# Patient Record
Sex: FEMALE | Race: WHITE | Employment: FULL TIME | URBAN - METROPOLITAN AREA
[De-identification: names, ages, dates, MRNs, and addresses within clinical notes are randomized per-mention and may not be internally consistent; named-entity substitution may affect disease eponyms.]

---

## 2018-08-10 ENCOUNTER — OFFICE VISIT (OUTPATIENT)
Dept: FAMILY MEDICINE CLINIC | Facility: CLINIC | Age: 28
End: 2018-08-10

## 2018-08-10 VITALS
SYSTOLIC BLOOD PRESSURE: 112 MMHG | HEART RATE: 96 BPM | DIASTOLIC BLOOD PRESSURE: 60 MMHG | BODY MASS INDEX: 28.63 KG/M2 | RESPIRATION RATE: 16 BRPM | HEIGHT: 63.5 IN | WEIGHT: 163.63 LBS | TEMPERATURE: 98 F | OXYGEN SATURATION: 98 %

## 2018-08-10 DIAGNOSIS — M47.9 DEGENERATIVE JOINT DISEASE OF LOW BACK: ICD-10-CM

## 2018-08-10 DIAGNOSIS — M54.2 CERVICAL PAIN (NECK): ICD-10-CM

## 2018-08-10 DIAGNOSIS — E06.3 HASHIMOTO'S THYROIDITIS: ICD-10-CM

## 2018-08-10 DIAGNOSIS — K58.2 IRRITABLE BOWEL SYNDROME WITH BOTH CONSTIPATION AND DIARRHEA: ICD-10-CM

## 2018-08-10 DIAGNOSIS — F41.9 ANXIETY: ICD-10-CM

## 2018-08-10 DIAGNOSIS — N97.9 FEMALE FERTILITY PROBLEM: ICD-10-CM

## 2018-08-10 DIAGNOSIS — Z00.00 ANNUAL PHYSICAL EXAM: Primary | ICD-10-CM

## 2018-08-10 PROBLEM — K58.9 IRRITABLE BOWEL SYNDROME (IBS): Status: ACTIVE | Noted: 2018-08-10

## 2018-08-10 PROCEDURE — 99385 PREV VISIT NEW AGE 18-39: CPT | Performed by: FAMILY MEDICINE

## 2018-08-10 RX ORDER — LEVOTHYROXINE SODIUM 0.15 MG/1
1 TABLET ORAL DAILY
COMMUNITY
Start: 2018-07-11 | End: 2019-08-21 | Stop reason: DRUGHIGH

## 2018-08-10 RX ORDER — BUPROPION HYDROCHLORIDE 150 MG/1
150 TABLET, EXTENDED RELEASE ORAL 2 TIMES DAILY
COMMUNITY
End: 2019-08-21 | Stop reason: ALTCHOICE

## 2018-08-10 RX ORDER — DEXAMETHASONE 2 MG/1
2 TABLET ORAL DAILY
COMMUNITY
End: 2019-08-21 | Stop reason: ALTCHOICE

## 2018-08-10 NOTE — PATIENT INSTRUCTIONS
rec stool softner-- colace 50 mg a day and monitor bowels    Monitor skin for now pending pregnancy    chiropractic referral- in network    Continue gyne and reproductive endo    Continue endocrine

## 2018-08-10 NOTE — PROGRESS NOTES
CC: Annual Physical Exam    HPI:   Albaro Alexis is a 33year old female who presents for a complete physical exam.   Hashimotos thyroiditis  Hx of meds and ultrasounds    Pt doing IVF- positive test this am -- 3rd pregnancy -- 2 prior chemical     H Topics    Smoking status: Never Smoker                                                                Smokeless tobacco: Never Used                        Alcohol use: Yes                Comment: Social    Drug use:  No               Exercise: minimal.  Die oz.   Vital signs reviewed. Appears stated age, well groomed. Physical Exam:  GEN:  Patient is alert, awake and oriented, well developed, well nourished, no apparent distress.   HEENT:  Head:  Normocephalic, atraumatic   Eyes: EOMI, PERRLA, no scleral icter constipation and diarrhea  Monitor with dietary canges and hormone changes    4. Degenerative joint disease of low back    - CHIROPRACTIC  - INTERNAL    5.  Female fertility problem  Continue with gyne and reproductive endocrine  - DOCOSAHEXAENOIC ACID OR;

## 2019-05-08 ENCOUNTER — TELEPHONE (OUTPATIENT)
Dept: FAMILY MEDICINE CLINIC | Facility: CLINIC | Age: 29
End: 2019-05-08

## 2019-05-08 NOTE — TELEPHONE ENCOUNTER
GAP report- due for cervical cancer screening. Pt goes to gyne- pt will call to have last pap report forwarded. Pt last seen in office 8/10/18 for annual.  Pt will call back to schedule next appt.

## 2019-08-18 ENCOUNTER — PATIENT MESSAGE (OUTPATIENT)
Dept: FAMILY MEDICINE CLINIC | Facility: CLINIC | Age: 29
End: 2019-08-18

## 2019-08-19 NOTE — TELEPHONE ENCOUNTER
From: Armen Perry  To: Tomas Barragan MD  Sent: 8/18/2019 10:21 PM CDT  Subject: Referral Request    When I come on Wednesday 8/21, I want to discuss referrals to an immunologist (Hashimoto’s) and a dermatologist (Vitiligo), as well as review

## 2019-08-21 ENCOUNTER — PATIENT MESSAGE (OUTPATIENT)
Dept: FAMILY MEDICINE CLINIC | Facility: CLINIC | Age: 29
End: 2019-08-21

## 2019-08-21 ENCOUNTER — OFFICE VISIT (OUTPATIENT)
Dept: FAMILY MEDICINE CLINIC | Facility: CLINIC | Age: 29
End: 2019-08-21
Payer: COMMERCIAL

## 2019-08-21 VITALS
DIASTOLIC BLOOD PRESSURE: 60 MMHG | BODY MASS INDEX: 31.68 KG/M2 | HEIGHT: 63 IN | RESPIRATION RATE: 16 BRPM | TEMPERATURE: 99 F | WEIGHT: 178.81 LBS | SYSTOLIC BLOOD PRESSURE: 120 MMHG | HEART RATE: 86 BPM | OXYGEN SATURATION: 99 %

## 2019-08-21 DIAGNOSIS — L80 VITILIGO: ICD-10-CM

## 2019-08-21 DIAGNOSIS — N97.9 FEMALE FERTILITY PROBLEM: ICD-10-CM

## 2019-08-21 DIAGNOSIS — E06.3 HASHIMOTO'S THYROIDITIS: ICD-10-CM

## 2019-08-21 DIAGNOSIS — Z00.00 ENCOUNTER FOR GENERAL HEALTH EXAMINATION: Primary | ICD-10-CM

## 2019-08-21 DIAGNOSIS — K58.2 IRRITABLE BOWEL SYNDROME WITH BOTH CONSTIPATION AND DIARRHEA: ICD-10-CM

## 2019-08-21 DIAGNOSIS — F41.9 ANXIETY: ICD-10-CM

## 2019-08-21 PROCEDURE — 99395 PREV VISIT EST AGE 18-39: CPT | Performed by: FAMILY MEDICINE

## 2019-08-21 RX ORDER — CALCIUM CITRATE, IRON PENTACARBONYL, CHOLECALCIFEROL, .ALPHA.-TOCOPHEROL, DL-, PYRIDOXINE HYDROCHLORIDE, FOLIC ACID, DOCUSATE SODIUM, AND DOCONEXENT 104; 27; 400; 30; 25; 1; 50; 260 MG/1; MG/1; [IU]/1; [IU]/1; MG/1; MG/1; MG/1; MG/1
1 CAPSULE, GELATIN COATED ORAL DAILY
Refills: 7 | COMMUNITY
Start: 2019-06-24

## 2019-08-21 RX ORDER — FAMOTIDINE 20 MG
1 TABLET ORAL DAILY
COMMUNITY
End: 2019-08-21

## 2019-08-21 RX ORDER — LEVOTHYROXINE SODIUM 0.15 MG/1
175 TABLET ORAL DAILY
COMMUNITY

## 2019-08-21 NOTE — PATIENT INSTRUCTIONS
Referral to new endocrine-- call for referral choice    Derm referral- gail Sawant    Await contact from pt to finalize referral    continue care with fertility specialist    Lee Li to call re psychiatric referral

## 2019-08-21 NOTE — PROGRESS NOTES
[place] [facility]  PROGRESS NOTE  Chief Complaint:   Patient presents with:  Physical      HPI:   This is a 33year old female coming in for general health review. Looking for referrals-multiple    Seeing therapist-- anxiety/ depression.   See l use: Yes        Comment: Social      Drug use: No    Family History:  History reviewed. No pertinent family history.   Allergies:  No Known Allergies  Current Meds:    Current Outpatient Medications:  Levothyroxine Sodium 175 MCG Oral Tab Take 175 mcg by mo following:    Height as of this encounter: 63\". Weight as of this encounter: 178 lb 12.8 oz. Vital signs reviewed. Appears stated age, well groomed.   Physical Exam:  GEN:  Patient is alert, awake and oriented, well developed, well nourished, no appare in this encounter       Health Maintenance:        Paulette Mari MD  8/21/2019  6:40 PM    Patient/Caregiver Education: Patient/Caregiver Education: There are no barriers to learning. Medical education done.    Outcome: Patient verbalizes understanding

## 2019-08-22 ENCOUNTER — TELEPHONE (OUTPATIENT)
Dept: FAMILY MEDICINE CLINIC | Facility: CLINIC | Age: 29
End: 2019-08-22

## 2019-08-22 PROBLEM — L80 VITILIGO: Status: ACTIVE | Noted: 2019-08-22

## 2019-08-22 NOTE — TELEPHONE ENCOUNTER
[employer] [facility] sent a request for pt's medical records from 1/1/17 to 8/16/2020. This was sent to [facility].

## 2019-08-23 ENCOUNTER — TELEPHONE (OUTPATIENT)
Dept: FAMILY MEDICINE CLINIC | Facility: CLINIC | Age: 29
End: 2019-08-23

## 2019-08-23 DIAGNOSIS — E06.3 HASHIMOTO'S THYROIDITIS: ICD-10-CM

## 2019-08-23 DIAGNOSIS — L80 VITILIGO: Primary | ICD-10-CM

## 2019-08-23 DIAGNOSIS — N97.9 FEMALE FERTILITY PROBLEM: ICD-10-CM

## 2019-08-23 NOTE — TELEPHONE ENCOUNTER
----- Message from Tommie Jerez sent at 8/22/2019  6:35 PM CDT -----  Regarding: Referral Request  Contact: [phone]  Hello.   After doing some looking, these are what I have landed on in terms of the referral requests we spoke about for Dermatol

## 2019-08-23 NOTE — TELEPHONE ENCOUNTER
Called Dr. Kane Moncada office to get fax # for referral, was told that Dr. Elvira Foss is no longer accepting new patient's. Given information that they do have new physician that will be starting-  Dr. Martie Jeans. Phone #[phone].     Pt was info

## 2019-08-23 NOTE — TELEPHONE ENCOUNTER
From: Judi Mcintosh  To: Sarah Echavarria MD  Sent: 8/21/2019 8:38 PM CDT  Subject: Visit Follow-up Question    Here is the referral letter from my therapist RE: medication.

## 2019-08-23 NOTE — TELEPHONE ENCOUNTER
Referral Request     Jovani Gaines MD 4 hours ago (10:21 AM)         Hello-   In lieu of the original Endocrinologist referral (who is not accepting new patients now), I would like to continue with the suggested referral for [facility]

## 2019-08-24 NOTE — TELEPHONE ENCOUNTER
Ok to change provider on referral, I was unable to find provider in the system- please review and clarify.

## 2019-08-27 NOTE — TELEPHONE ENCOUNTER
Called to clarify referral reuest for endocrine:    Dr. Lisandro Norris. [address] [place], [address]. YZPUP:[phone]. Fax#[phone].

## 2019-09-14 NOTE — PROGRESS NOTES
Ginny Sandhu is a 33year old female. HPI:   Patient presents for recheck of her anxiety Pt has been meeting with therapist. Pt has appt with psychiatry scheduled. Pt and I had previously discussed alternative medications for anxiety.   Pristiq w Instructions   sravanthi snow       The patient indicates understanding of these issues and agrees to the plan.     PF#[id number]

## 2019-09-14 NOTE — PATIENT INSTRUCTIONS
Okay for new prescription to be sent to the pharmacy. Patient to continue follow-up with counseling consult with psychiatry or medical follow-up here in 6 weeks.

## 2019-10-11 RX ORDER — DESVENLAFAXINE 25 MG/1
TABLET, EXTENDED RELEASE ORAL
Qty: 30 TABLET | Refills: 0 | Status: SHIPPED | OUTPATIENT
Start: 2019-10-11 | End: 2019-11-08

## 2019-10-11 NOTE — TELEPHONE ENCOUNTER
Future appt:    Last Appointment with provider:   9/14/2019  Last appointment at [facility]:  9/14/2019    Desvenlafaxine was refilld on 9/14 for #30        No results found for: CHOLEST, HDL, LDL, TRIGLY, TRIG  No results found for: EAG, A1C  No results

## 2019-11-08 RX ORDER — DESVENLAFAXINE 25 MG/1
TABLET, EXTENDED RELEASE ORAL
Qty: 30 TABLET | Refills: 0 | Status: SHIPPED | OUTPATIENT
Start: 2019-11-08 | End: 2019-11-26

## 2019-11-08 NOTE — TELEPHONE ENCOUNTER
Future appt:     Your appointments     Date & Time Appointment Department [facility])    Nov 26, 2019 11:30 AM CST Follow up Visit with Kieran Talley MD [facility] [place] ([place])    Please arrive 15 odette

## 2019-11-25 ENCOUNTER — TELEPHONE (OUTPATIENT)
Dept: FAMILY MEDICINE CLINIC | Facility: CLINIC | Age: 29
End: 2019-11-25

## 2019-11-25 NOTE — TELEPHONE ENCOUNTER
Patient would like to get whooping cough vaccine, wants to know if she can get it tomorrow during her appt. Would like a call back.

## 2019-11-25 NOTE — TELEPHONE ENCOUNTER
Future Appointments   Date Time Provider Tono Tyler   11/26/2019 11:30 AM Gomez Howe MD [facility] [place]     Patient has appointment with me tomorrow- will d/w her then.

## 2019-11-26 NOTE — PATIENT INSTRUCTIONS
Continue care with endocrine    rec continue treatment vitaligo per dermatology    Tetnus vaccine today    Recheck 6 weeks if not seeing psychiatry

## 2019-11-26 NOTE — PROGRESS NOTES
Camryn Aldridge is a 33year old female. HPI:   Patient presents for recheck of her anxiety and medications. . Pt has been taking medications as instructed, no medication side effects.      Pt restarting visitis at [facility]  Met with counerinor for i breath  CARDIOVASCULAR: denies chest pain  GI: denies abdominal pain and denies heartburn  NEURO: denies headaches    EXAM:   /72 (BP Location: Left arm, Patient Position: Sitting, Cuff Size: adult)   Pulse 78   Temp 97.6 °F (36.4 °C) (Temporal)   Re

## 2020-02-17 RX ORDER — DESVENLAFAXINE 50 MG/1
50 TABLET, EXTENDED RELEASE ORAL DAILY
Qty: 30 TABLET | Refills: 0 | Status: SHIPPED | OUTPATIENT
Start: 2020-02-17

## 2020-02-17 NOTE — TELEPHONE ENCOUNTER
Future appt:    Last Appointment with provider:   11/26/2019-  Pt was asked to return in 6 wks f not seeing psychiatry  Last appointment at [facility]:  11/26/2019  Last px: 8/21/19    Called pt, states she has appt set up with psychiatry this Wed, but w

## 2023-02-14 ENCOUNTER — PATIENT OUTREACH (OUTPATIENT)
Dept: CASE MANAGEMENT | Age: 33
End: 2023-02-14

## 2023-02-14 NOTE — PROCEDURES
The office order for PCP request is Approved and finalized on February 14, 2023.     Thanks,  [facility] [employer]